# Patient Record
Sex: MALE | Race: WHITE
[De-identification: names, ages, dates, MRNs, and addresses within clinical notes are randomized per-mention and may not be internally consistent; named-entity substitution may affect disease eponyms.]

---

## 2020-12-22 ENCOUNTER — HOSPITAL ENCOUNTER (EMERGENCY)
Age: 40
Discharge: HOME | End: 2020-12-22
Payer: COMMERCIAL

## 2020-12-22 VITALS
OXYGEN SATURATION: 98 % | TEMPERATURE: 98.7 F | DIASTOLIC BLOOD PRESSURE: 91 MMHG | RESPIRATION RATE: 20 BRPM | SYSTOLIC BLOOD PRESSURE: 124 MMHG | HEART RATE: 74 BPM

## 2020-12-22 VITALS
SYSTOLIC BLOOD PRESSURE: 124 MMHG | HEART RATE: 74 BPM | DIASTOLIC BLOOD PRESSURE: 91 MMHG | RESPIRATION RATE: 20 BRPM | OXYGEN SATURATION: 98 % | TEMPERATURE: 98.7 F

## 2020-12-22 VITALS — BODY MASS INDEX: 25.4 KG/M2
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
## 2020-12-22 DIAGNOSIS — S39.013A: ICD-10-CM
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
## 2020-12-22 PROCEDURE — 99201: CPT

## 2021-01-13 ENCOUNTER — HOSPITAL ENCOUNTER (OUTPATIENT)
Dept: HOSPITAL 22 - INF | Age: 41
Discharge: HOME | End: 2021-01-13
Payer: COMMERCIAL

## 2021-01-13 VITALS
RESPIRATION RATE: 18 BRPM | TEMPERATURE: 99.32 F | OXYGEN SATURATION: 98 % | DIASTOLIC BLOOD PRESSURE: 98 MMHG | HEART RATE: 97 BPM | SYSTOLIC BLOOD PRESSURE: 157 MMHG

## 2021-01-13 DIAGNOSIS — K40.90: Primary | ICD-10-CM

## 2021-01-13 DIAGNOSIS — S39.013D: ICD-10-CM

## 2021-01-13 PROCEDURE — 96372 THER/PROPH/DIAG INJ SC/IM: CPT

## 2021-02-24 ENCOUNTER — HOSPITAL ENCOUNTER (OUTPATIENT)
Age: 41
End: 2021-02-24
Payer: COMMERCIAL

## 2021-02-24 DIAGNOSIS — S39.013A: Primary | ICD-10-CM

## 2021-02-24 PROCEDURE — 74176 CT ABD & PELVIS W/O CONTRAST: CPT

## 2021-03-18 ENCOUNTER — HOSPITAL ENCOUNTER (OUTPATIENT)
Age: 41
End: 2021-03-18
Payer: COMMERCIAL

## 2021-03-18 VITALS
OXYGEN SATURATION: 96 % | HEART RATE: 81 BPM | SYSTOLIC BLOOD PRESSURE: 144 MMHG | RESPIRATION RATE: 20 BRPM | DIASTOLIC BLOOD PRESSURE: 99 MMHG

## 2021-03-18 VITALS — BODY MASS INDEX: 28.1 KG/M2

## 2021-03-18 DIAGNOSIS — G89.29: ICD-10-CM

## 2021-03-18 DIAGNOSIS — R10.31: Primary | ICD-10-CM

## 2021-03-18 PROCEDURE — G0463 HOSPITAL OUTPT CLINIC VISIT: HCPCS

## 2021-03-18 PROCEDURE — 99202 OFFICE O/P NEW SF 15 MIN: CPT

## 2021-04-05 ENCOUNTER — HOSPITAL ENCOUNTER (OUTPATIENT)
Age: 41
End: 2021-04-05
Payer: COMMERCIAL

## 2021-04-05 DIAGNOSIS — R10.31: Primary | ICD-10-CM

## 2021-04-05 PROCEDURE — 72195 MRI PELVIS W/O DYE: CPT

## 2021-04-15 ENCOUNTER — HOSPITAL ENCOUNTER (OUTPATIENT)
Age: 41
End: 2021-04-15
Payer: COMMERCIAL

## 2021-04-15 VITALS
DIASTOLIC BLOOD PRESSURE: 72 MMHG | SYSTOLIC BLOOD PRESSURE: 139 MMHG | OXYGEN SATURATION: 98 % | HEART RATE: 68 BPM | RESPIRATION RATE: 18 BRPM

## 2021-04-15 VITALS — BODY MASS INDEX: 26.6 KG/M2

## 2021-04-15 DIAGNOSIS — R10.2: Primary | ICD-10-CM

## 2021-04-15 PROCEDURE — 99212 OFFICE O/P EST SF 10 MIN: CPT

## 2021-04-15 PROCEDURE — G0463 HOSPITAL OUTPT CLINIC VISIT: HCPCS

## 2021-05-27 ENCOUNTER — HOSPITAL ENCOUNTER (OUTPATIENT)
Age: 41
End: 2021-05-27
Payer: COMMERCIAL

## 2021-05-27 VITALS
DIASTOLIC BLOOD PRESSURE: 90 MMHG | OXYGEN SATURATION: 98 % | HEART RATE: 103 BPM | RESPIRATION RATE: 18 BRPM | SYSTOLIC BLOOD PRESSURE: 146 MMHG

## 2021-05-27 VITALS — BODY MASS INDEX: 27.3 KG/M2

## 2021-05-27 DIAGNOSIS — M46.1: Primary | ICD-10-CM

## 2021-05-27 DIAGNOSIS — M25.551: ICD-10-CM

## 2021-05-27 DIAGNOSIS — R10.31: ICD-10-CM

## 2021-05-27 PROCEDURE — G0463 HOSPITAL OUTPT CLINIC VISIT: HCPCS

## 2021-05-27 PROCEDURE — 99212 OFFICE O/P EST SF 10 MIN: CPT

## 2021-06-21 ENCOUNTER — HOSPITAL ENCOUNTER (OUTPATIENT)
Age: 41
End: 2021-06-21
Payer: COMMERCIAL

## 2021-06-21 VITALS — BODY MASS INDEX: 27.3 KG/M2

## 2021-06-21 VITALS — RESPIRATION RATE: 18 BRPM | OXYGEN SATURATION: 98 %

## 2021-06-21 DIAGNOSIS — M54.5: ICD-10-CM

## 2021-06-21 DIAGNOSIS — M25.551: ICD-10-CM

## 2021-06-21 DIAGNOSIS — R10.31: ICD-10-CM

## 2021-06-21 DIAGNOSIS — M54.16: ICD-10-CM

## 2021-06-21 DIAGNOSIS — M46.1: Primary | ICD-10-CM

## 2021-06-21 PROCEDURE — 99212 OFFICE O/P EST SF 10 MIN: CPT

## 2021-06-21 PROCEDURE — G0463 HOSPITAL OUTPT CLINIC VISIT: HCPCS

## 2021-07-05 ENCOUNTER — HOSPITAL ENCOUNTER (OUTPATIENT)
Age: 41
End: 2021-07-05
Payer: COMMERCIAL

## 2021-07-05 VITALS
OXYGEN SATURATION: 96 % | SYSTOLIC BLOOD PRESSURE: 153 MMHG | HEART RATE: 89 BPM | RESPIRATION RATE: 18 BRPM | DIASTOLIC BLOOD PRESSURE: 105 MMHG

## 2021-07-05 VITALS — BODY MASS INDEX: 27.3 KG/M2

## 2021-07-05 DIAGNOSIS — M25.551: ICD-10-CM

## 2021-07-05 DIAGNOSIS — M54.5: Primary | ICD-10-CM

## 2021-07-05 DIAGNOSIS — R10.31: ICD-10-CM

## 2021-07-05 PROCEDURE — G0463 HOSPITAL OUTPT CLINIC VISIT: HCPCS

## 2021-07-05 PROCEDURE — 99212 OFFICE O/P EST SF 10 MIN: CPT

## 2021-07-06 ENCOUNTER — HOSPITAL ENCOUNTER (OUTPATIENT)
Dept: HOSPITAL 22 - PT | Age: 41
Discharge: HOME | End: 2021-07-06
Payer: COMMERCIAL

## 2021-07-06 DIAGNOSIS — M25.552: ICD-10-CM

## 2021-07-06 DIAGNOSIS — M54.5: Primary | ICD-10-CM

## 2021-07-06 DIAGNOSIS — M25.551: ICD-10-CM

## 2021-07-06 DIAGNOSIS — M79.604: ICD-10-CM

## 2021-07-06 DIAGNOSIS — M79.605: ICD-10-CM

## 2021-07-06 PROCEDURE — 97010 HOT OR COLD PACKS THERAPY: CPT

## 2021-07-06 PROCEDURE — 97035 APP MDLTY 1+ULTRASOUND EA 15: CPT

## 2021-07-06 PROCEDURE — 97110 THERAPEUTIC EXERCISES: CPT

## 2021-07-06 PROCEDURE — G0283 ELEC STIM OTHER THAN WOUND: HCPCS

## 2021-07-06 PROCEDURE — 20560 NDL INSJ W/O NJX 1 OR 2 MUSC: CPT

## 2021-07-06 PROCEDURE — 97163 PT EVAL HIGH COMPLEX 45 MIN: CPT

## 2021-07-06 PROCEDURE — 97014 ELECTRIC STIMULATION THERAPY: CPT

## 2021-07-22 ENCOUNTER — HOSPITAL ENCOUNTER (OUTPATIENT)
Age: 41
End: 2021-07-22
Payer: COMMERCIAL

## 2021-07-22 VITALS — BODY MASS INDEX: 28 KG/M2

## 2021-07-22 VITALS
HEART RATE: 83 BPM | OXYGEN SATURATION: 98 % | SYSTOLIC BLOOD PRESSURE: 134 MMHG | RESPIRATION RATE: 18 BRPM | DIASTOLIC BLOOD PRESSURE: 90 MMHG

## 2021-07-22 DIAGNOSIS — M46.1: Primary | ICD-10-CM

## 2021-07-22 DIAGNOSIS — M70.61: ICD-10-CM

## 2021-07-22 PROCEDURE — 99212 OFFICE O/P EST SF 10 MIN: CPT

## 2021-07-22 PROCEDURE — G0463 HOSPITAL OUTPT CLINIC VISIT: HCPCS

## 2021-08-06 ENCOUNTER — HOSPITAL ENCOUNTER (OUTPATIENT)
Age: 41
End: 2021-08-06
Payer: COMMERCIAL

## 2021-08-06 VITALS
DIASTOLIC BLOOD PRESSURE: 68 MMHG | RESPIRATION RATE: 20 BRPM | SYSTOLIC BLOOD PRESSURE: 184 MMHG | OXYGEN SATURATION: 98 % | TEMPERATURE: 98.06 F | HEART RATE: 68 BPM

## 2021-08-06 VITALS
OXYGEN SATURATION: 98 % | RESPIRATION RATE: 18 BRPM | DIASTOLIC BLOOD PRESSURE: 64 MMHG | SYSTOLIC BLOOD PRESSURE: 179 MMHG | HEART RATE: 72 BPM

## 2021-08-06 VITALS
SYSTOLIC BLOOD PRESSURE: 129 MMHG | DIASTOLIC BLOOD PRESSURE: 79 MMHG | HEART RATE: 83 BPM | RESPIRATION RATE: 18 BRPM | OXYGEN SATURATION: 99 %

## 2021-08-06 VITALS
RESPIRATION RATE: 18 BRPM | DIASTOLIC BLOOD PRESSURE: 96 MMHG | SYSTOLIC BLOOD PRESSURE: 132 MMHG | HEART RATE: 74 BPM | OXYGEN SATURATION: 99 %

## 2021-08-06 VITALS — BODY MASS INDEX: 28.8 KG/M2

## 2021-08-06 DIAGNOSIS — G43.909: ICD-10-CM

## 2021-08-06 DIAGNOSIS — Z88.6: ICD-10-CM

## 2021-08-06 DIAGNOSIS — Z72.0: ICD-10-CM

## 2021-08-06 DIAGNOSIS — M70.61: ICD-10-CM

## 2021-08-06 DIAGNOSIS — M46.1: Primary | ICD-10-CM

## 2021-08-06 DIAGNOSIS — Z88.2: ICD-10-CM

## 2021-08-06 PROCEDURE — 20610 DRAIN/INJ JOINT/BURSA W/O US: CPT

## 2021-08-06 PROCEDURE — 77002 NEEDLE LOCALIZATION BY XRAY: CPT

## 2021-08-06 PROCEDURE — 27096 INJECT SACROILIAC JOINT: CPT

## 2021-08-06 PROCEDURE — G0260 INJ FOR SACROILIAC JT ANESTH: HCPCS

## 2021-08-10 ENCOUNTER — HOSPITAL ENCOUNTER (OUTPATIENT)
Age: 41
End: 2021-08-10
Payer: COMMERCIAL

## 2021-08-10 DIAGNOSIS — M54.5: Primary | ICD-10-CM

## 2021-08-10 PROCEDURE — 76376 3D RENDER W/INTRP POSTPROCES: CPT

## 2021-08-10 PROCEDURE — 72148 MRI LUMBAR SPINE W/O DYE: CPT

## 2021-08-16 ENCOUNTER — HOSPITAL ENCOUNTER (OUTPATIENT)
Age: 41
End: 2021-08-16
Payer: COMMERCIAL

## 2021-08-16 VITALS
RESPIRATION RATE: 18 BRPM | DIASTOLIC BLOOD PRESSURE: 90 MMHG | HEART RATE: 97 BPM | SYSTOLIC BLOOD PRESSURE: 146 MMHG | OXYGEN SATURATION: 98 %

## 2021-08-16 VITALS — BODY MASS INDEX: 28.8 KG/M2

## 2021-08-16 DIAGNOSIS — M51.16: Primary | ICD-10-CM

## 2021-08-16 PROCEDURE — G0463 HOSPITAL OUTPT CLINIC VISIT: HCPCS

## 2021-08-16 PROCEDURE — 99212 OFFICE O/P EST SF 10 MIN: CPT

## 2021-08-27 ENCOUNTER — HOSPITAL ENCOUNTER (OUTPATIENT)
Dept: HOSPITAL 22 - SC.PAINP | Age: 41
Discharge: HOME | End: 2021-08-27
Payer: COMMERCIAL

## 2021-08-27 VITALS
RESPIRATION RATE: 18 BRPM | DIASTOLIC BLOOD PRESSURE: 97 MMHG | OXYGEN SATURATION: 97 % | SYSTOLIC BLOOD PRESSURE: 137 MMHG | TEMPERATURE: 98.6 F | HEART RATE: 104 BPM

## 2021-08-27 VITALS
HEART RATE: 101 BPM | RESPIRATION RATE: 18 BRPM | OXYGEN SATURATION: 98 % | DIASTOLIC BLOOD PRESSURE: 79 MMHG | SYSTOLIC BLOOD PRESSURE: 122 MMHG

## 2021-08-27 VITALS
RESPIRATION RATE: 18 BRPM | HEART RATE: 98 BPM | DIASTOLIC BLOOD PRESSURE: 79 MMHG | SYSTOLIC BLOOD PRESSURE: 122 MMHG | OXYGEN SATURATION: 97 %

## 2021-08-27 VITALS
SYSTOLIC BLOOD PRESSURE: 126 MMHG | HEART RATE: 105 BPM | OXYGEN SATURATION: 97 % | RESPIRATION RATE: 20 BRPM | DIASTOLIC BLOOD PRESSURE: 90 MMHG

## 2021-08-27 VITALS — BODY MASS INDEX: 28.4 KG/M2

## 2021-08-27 DIAGNOSIS — Z88.2: ICD-10-CM

## 2021-08-27 DIAGNOSIS — M51.16: Primary | ICD-10-CM

## 2021-08-27 DIAGNOSIS — Z88.6: ICD-10-CM

## 2021-08-27 DIAGNOSIS — J45.909: ICD-10-CM

## 2021-08-27 PROCEDURE — 62323 NJX INTERLAMINAR LMBR/SAC: CPT

## 2021-10-01 ENCOUNTER — HOSPITAL ENCOUNTER (OUTPATIENT)
Age: 41
End: 2021-10-01
Payer: COMMERCIAL

## 2021-10-01 VITALS — BODY MASS INDEX: 28.8 KG/M2

## 2021-10-01 VITALS
DIASTOLIC BLOOD PRESSURE: 99 MMHG | HEART RATE: 88 BPM | RESPIRATION RATE: 20 BRPM | TEMPERATURE: 98.9 F | OXYGEN SATURATION: 99 % | SYSTOLIC BLOOD PRESSURE: 151 MMHG

## 2021-10-01 DIAGNOSIS — M54.31: ICD-10-CM

## 2021-10-01 DIAGNOSIS — M51.16: Primary | ICD-10-CM

## 2021-10-01 DIAGNOSIS — M46.1: ICD-10-CM

## 2021-10-01 PROCEDURE — 99212 OFFICE O/P EST SF 10 MIN: CPT

## 2021-10-01 PROCEDURE — G0463 HOSPITAL OUTPT CLINIC VISIT: HCPCS

## 2021-11-11 ENCOUNTER — OFFICE VISIT (OUTPATIENT)
Dept: NEUROSURGERY | Facility: CLINIC | Age: 41
End: 2021-11-11

## 2021-11-11 VITALS
BODY MASS INDEX: 30.22 KG/M2 | TEMPERATURE: 97.1 F | WEIGHT: 199.4 LBS | DIASTOLIC BLOOD PRESSURE: 76 MMHG | HEIGHT: 68 IN | SYSTOLIC BLOOD PRESSURE: 114 MMHG

## 2021-11-11 DIAGNOSIS — M25.559 HIP PAIN: ICD-10-CM

## 2021-11-11 DIAGNOSIS — M51.26 LUMBAR DISC HERNIATION: Primary | ICD-10-CM

## 2021-11-11 PROCEDURE — 99204 OFFICE O/P NEW MOD 45 MIN: CPT | Performed by: NEUROLOGICAL SURGERY

## 2021-11-11 RX ORDER — ACETAMINOPHEN 500 MG
500 TABLET ORAL EVERY 6 HOURS PRN
COMMUNITY

## 2021-11-11 RX ORDER — IBUPROFEN 200 MG
200 TABLET ORAL EVERY 6 HOURS PRN
COMMUNITY

## 2021-11-11 NOTE — PATIENT INSTRUCTIONS
Please have the following items complete prior to your telephone visit with Dr. Mireles.    1. EMG/NCV (Nerve Conduction Study)  2. MRI Hip (Right)  3. XR Hips  4. XR Lumbar Spine -Flexion/Extension

## 2021-11-11 NOTE — PROGRESS NOTES
NAME: TOMER BROWN JR.   DOS: 2021  : 1980  PCP: Enoch Muir MD    Chief Complaint:    Chief Complaint   Patient presents with   • Low Back Pain & Right Hip/Leg Pain       History of Present Illness:  40 y.o. male   I saw this 40-year-old gentleman in neurosurgical consultation for Worker's Comp. related play claim.  Interestingly he presents with almost a year of symptomatology.  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    He is plagued by chronic daily back and right-sided buttock pain the pain is also associated with inguinal area tenderness as well as lateral iliotibial band he denies pain that radiates down the leg he denies neurogenic claudication he does have daily back pain ever since his accident and has been through multiple courses of therapy he is accompanied here by his significant other as well as his     He denies cauda equina syndrome    PMHX  Allergies:  Allergies   Allergen Reactions   • Codeine Other (See Comments)     Mother allergic   • Sulfa Antibiotics Nausea And Vomiting     Medications    Current Outpatient Medications:   •  acetaminophen (TYLENOL) 500 MG tablet, Take 500 mg by mouth Every 6 (Six) Hours As Needed for Mild Pain ., Disp: , Rfl:   •  ibuprofen (ADVIL,MOTRIN) 200 MG tablet, Take 200 mg by mouth Every 6 (Six) Hours As Needed for Mild Pain ., Disp: , Rfl:   Past Medical History:  Past Medical History:   Diagnosis Date   • Headache    • Low back pain    • Thyroid disease      Past Surgical History:  Past Surgical History:    Procedure Laterality Date   • WISDOM TOOTH EXTRACTION       Social Hx:  Social History     Tobacco Use   • Smoking status: Former Smoker     Packs/day: 1.00     Quit date:      Years since quittin.8   • Smokeless tobacco: Never Used   Vaping Use   • Vaping Use: Never used   Substance Use Topics   • Alcohol use: Yes     Comment: occasional   • Drug use: Never     Family Hx:  Family History   Problem Relation Age of Onset   • Hypertension Mother    • Hypertension Father    • Diabetes Maternal Grandfather      Review of Systems:        Review of Systems   Constitutional: Negative for activity change, appetite change, chills, diaphoresis, fatigue, fever and unexpected weight change.   HENT: Negative for congestion, dental problem, drooling, ear discharge, ear pain, facial swelling, hearing loss, mouth sores, nosebleeds, postnasal drip, rhinorrhea, sinus pressure, sinus pain, sneezing, sore throat, tinnitus, trouble swallowing and voice change.    Eyes: Negative for photophobia, pain, discharge, redness, itching and visual disturbance.   Respiratory: Negative for apnea, cough, choking, chest tightness, shortness of breath, wheezing and stridor.    Cardiovascular: Negative for chest pain, palpitations and leg swelling.   Gastrointestinal: Negative for abdominal distention, abdominal pain, anal bleeding, blood in stool, constipation, diarrhea, nausea, rectal pain and vomiting.   Endocrine: Negative for cold intolerance, heat intolerance, polydipsia, polyphagia and polyuria.   Genitourinary: Negative for decreased urine volume, difficulty urinating, dysuria, enuresis, flank pain, frequency, genital sores, hematuria, penile discharge, penile pain, penile swelling, scrotal swelling, testicular pain and urgency.   Musculoskeletal: Positive for back pain. Negative for arthralgias, gait problem, joint swelling, myalgias, neck pain and neck stiffness.   Skin: Negative for color change, pallor, rash and wound.    Allergic/Immunologic: Negative for environmental allergies, food allergies and immunocompromised state.   Neurological: Negative for dizziness, tremors, seizures, syncope, facial asymmetry, speech difficulty, weakness, light-headedness, numbness and headaches.   Hematological: Negative for adenopathy. Does not bruise/bleed easily.   Psychiatric/Behavioral: Negative for agitation, behavioral problems, confusion, decreased concentration, dysphoric mood, hallucinations, self-injury, sleep disturbance and suicidal ideas. The patient is not nervous/anxious and is not hyperactive.    I have reviewed this note template and all pertinent parts of the review of systems social, family history, surgical history and medication list        Physical Examination:  Vitals:    11/11/21 1531   BP: 114/76   Temp: 97.1 °F (36.2 °C)      General Appearance:   Well developed, well nourished, well groomed, alert, and cooperative.  Neurological examination:  Neurologic Exam  Vital signs were reviewed and documented in the chart  Patient appeared in good neurologic function with normal comprehension fluent speech  Mood and affect are normal  Sense of smell deferred  He appears uncomfortable  Covid mask left in place  Back is without lesions  Muscle bulk and tone normal  5 out of 5 strength no motor drift, guarding on examination  Gait normal intact, antalgic  Negative Romberg  No clonus long tract signs or myelopathy    Reflexes symmetric  No edema noted and extremities skin appears normal    Straight leg raise sign absent bilaterally  Bilateral worse on the right side signs of intrinsic hip dysfunction  Back is without any lesions or abnormality  Feet are warm and well perfused        Review of Imaging/DATA:  I reviewed the MRI of the lumbar spine its a moderate quality study has isolated degenerative changes with annular disruption at L4-5 interestingly most of his small disc bulging is eccentric to the left think is more concordant  with his recent left leg flareup  Diagnoses/Plan:    Mr. Keller is a 40 y.o. male   There is a 40-year-old gentleman who is a about a year old history of workers comp related injury he has been back to light duty but is plagued by daily back pain he has right inguinal pain and no evidence of clear-cut radiculopathy below the knee he has signs of intrinsic hip dysfunction on the right side I suspect he may have a labral tear it is difficult to ascertain he certainly has degenerative changes at L4-5 with what I would suspect is lateral recess or and/or central disc bulging    My plan will be for    1.  Right-sided hip MRI, hip x-ray to look for labral tears and soft tissue issues  2.  EMG nerve conduction study lower extremity bilateral  3.  Lumbar flexion-extension film to evaluate for mechanical instability    If the symptoms show a labral and/or acetabular pathology I would refer to an orthopedist    If his symptoms are consistent with L5 radiculopathy I would recommend lumbar myelogram with possible discography for further work-up    I explained the complexities of this issue given its longstanding nature and it may be multifactorial  I will reviewed the studies and set up a follow-up by phone if there is something that I need to be involved in regarding lumbar spinal surgical care I will see him back in person    Is been a pleasure via neurosurgical care I see nothing that should threaten his function at present but he certainly is uncomfortable

## 2021-11-23 ENCOUNTER — TELEPHONE (OUTPATIENT)
Dept: NEUROSURGERY | Facility: CLINIC | Age: 41
End: 2021-11-23

## 2021-11-23 NOTE — TELEPHONE ENCOUNTER
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 SHE STATES SHE HAS LEFT SEVERAL MESSAGES FOR ARAM AND HAS NOT RECEIVED WORD - SENDING HIGH PRIORITY DUE TO MULTIPLE ATTEMPTS TO CONTACT. PLEASE CB @ [phone]. THANK YOU.

## 2021-12-01 DIAGNOSIS — S39.013D: ICD-10-CM

## 2021-12-01 DIAGNOSIS — S76.211A STRAIN OF ADDUCTOR MAGNUS MUSCLE, RIGHT, INITIAL ENCOUNTER: Primary | ICD-10-CM

## 2021-12-03 NOTE — PROGRESS NOTES
Per Rosetta: MRI, Both XR, and EMG all need to have the following diagnosis codes in order to be approved.   S76.211A & S39.013D.     All orders have been replaced with the above codes.

## 2022-01-20 ENCOUNTER — HOSPITAL ENCOUNTER (OUTPATIENT)
Dept: MRI IMAGING | Facility: HOSPITAL | Age: 42
Discharge: HOME | End: 2022-01-20

## 2022-01-20 ENCOUNTER — HOSPITAL ENCOUNTER (OUTPATIENT)
Dept: GENERAL RADIOLOGY | Facility: HOSPITAL | Age: 42
Discharge: HOME | End: 2022-01-20

## 2022-01-20 DIAGNOSIS — S76.211A STRAIN OF ADDUCTOR MAGNUS MUSCLE, RIGHT, INITIAL ENCOUNTER: ICD-10-CM

## 2022-01-20 DIAGNOSIS — S39.013D: ICD-10-CM

## 2022-01-20 PROCEDURE — 73502 X-RAY EXAM HIP UNI 2-3 VIEWS: CPT

## 2022-01-20 PROCEDURE — 73721 MRI JNT OF LWR EXTRE W/O DYE: CPT

## 2022-01-20 PROCEDURE — 72120 X-RAY BEND ONLY L-S SPINE: CPT

## 2022-02-07 ENCOUNTER — OFFICE VISIT (OUTPATIENT)
Dept: NEUROSURGERY | Facility: CLINIC | Age: 42
End: 2022-02-07

## 2022-02-07 VITALS
HEIGHT: 68 IN | BODY MASS INDEX: 30.52 KG/M2 | TEMPERATURE: 98.2 F | HEART RATE: 75 BPM | WEIGHT: 201.4 LBS | DIASTOLIC BLOOD PRESSURE: 90 MMHG | SYSTOLIC BLOOD PRESSURE: 130 MMHG | OXYGEN SATURATION: 98 %

## 2022-02-07 DIAGNOSIS — M51.26 RUPTURED LUMBAR DISC: ICD-10-CM

## 2022-02-07 DIAGNOSIS — M51.26 HNP (HERNIATED NUCLEUS PULPOSUS), LUMBAR: Primary | ICD-10-CM

## 2022-02-07 PROCEDURE — 99214 OFFICE O/P EST MOD 30 MIN: CPT | Performed by: NEUROLOGICAL SURGERY

## 2022-02-07 NOTE — PROGRESS NOTES
NAME: TOMER BROWN JR.   DOS: 2022  : 1980  PCP: Enoch Muir MD    Chief Complaint:    Chief Complaint   Patient presents with   • Low back and right hip/leg pain       History of Present Illness:  41 y.o. male   I saw this 41-year-old male in neurosurgical consultation. He has a history of Worker's Comp. he is been off work for a year he interestingly presented with some right inguinal pain has been worked up for hernia and then has left-sided paraspinal and sciatic pain    The majority of his complaints are in his right groin area he is here for follow-up with his     [facility]  Allergies:  Allergies   Allergen Reactions   • Codeine Other (See Comments)     Mother allergic   • Sulfa Antibiotics Nausea And Vomiting     Medications    Current Outpatient Medications:   •  acetaminophen (TYLENOL) 500 MG tablet, Take 500 mg by mouth Every 6 (Six) Hours As Needed for Mild Pain ., Disp: , Rfl:   •  ibuprofen (ADVIL,MOTRIN) 200 MG tablet, Take 200 mg by mouth Every 6 (Six) Hours As Needed for Mild Pain ., Disp: , Rfl:   •  Menthol, Topical Analgesic, (BIOFREEZE EX), Apply  topically As Needed., Disp: , Rfl:   Past Medical History:  Past Medical History:   Diagnosis Date   • Headache    • Low back pain    • Thyroid disease      Past Surgical History:  Past Surgical History:   Procedure Laterality Date   • WISDOM TOOTH EXTRACTION       Social Hx:  Social History     Tobacco Use   • Smoking status: Former Smoker     Packs/day: 1.00     Quit date:      Years since quittin.1   • Smokeless tobacco: Never Used   Vaping Use   • Vaping Use: Never used   Substance Use Topics   • Alcohol use: Yes     Comment: occasional   • Drug use: Never     Family Hx:  Family History   Problem Relation Age of Onset   • Hypertension Mother    • Hypertension Father    • Diabetes Maternal Grandfather      Review of Systems:        Review of Systems   Constitutional: Negative for activity change, appetite  change, chills, diaphoresis, fatigue, fever and unexpected weight change.   HENT: Negative for congestion, dental problem, drooling, ear discharge, ear pain, facial swelling, hearing loss, mouth sores, nosebleeds, postnasal drip, rhinorrhea, sinus pressure, sinus pain, sneezing, sore throat, tinnitus, trouble swallowing and voice change.    Eyes: Negative for photophobia, pain, discharge, redness, itching and visual disturbance.   Respiratory: Negative for apnea, cough, choking, chest tightness, shortness of breath, wheezing and stridor.    Cardiovascular: Negative for chest pain, palpitations and leg swelling.   Gastrointestinal: Negative for abdominal distention, abdominal pain, anal bleeding, blood in stool, constipation, diarrhea, nausea, rectal pain and vomiting.   Endocrine: Negative for cold intolerance, heat intolerance, polydipsia, polyphagia and polyuria.   Genitourinary: Negative for decreased urine volume, difficulty urinating, dysuria, enuresis, flank pain, frequency, genital sores, hematuria and urgency.   Musculoskeletal: Positive for back pain. Negative for arthralgias, gait problem, joint swelling, myalgias, neck pain and neck stiffness.   Skin: Negative for color change, pallor, rash and wound.   Allergic/Immunologic: Negative for environmental allergies, food allergies and immunocompromised state.   Neurological: Negative for dizziness, tremors, seizures, syncope, facial asymmetry, speech difficulty, weakness, light-headedness, numbness and headaches.   Hematological: Negative for adenopathy. Does not bruise/bleed easily.   Psychiatric/Behavioral: Negative for agitation, behavioral problems, confusion, decreased concentration, dysphoric mood, hallucinations, self-injury, sleep disturbance and suicidal ideas. The patient is not nervous/anxious and is not hyperactive.    All other systems reviewed and are negative.           Physical Examination:  Vitals:    02/07/22 1151   BP: 130/90   Pulse: 75  "  Temp: 98.2 °F (36.8 °C)   SpO2: 98%      General Appearance:   Well developed, well nourished, well groomed, alert, and cooperative.  Neurological examination:  Neurologic Exam    He is at his baseline with no intrinsic hip dysfunction    He has no weakness overall appears unchanged  Review of Imaging/DATA:  I reviewed his MRI of the lumbar spine there is a left eccentric disc bulge and/or herniation, there is a suspected annular tear with a \"white dots\" signed his MRI of the hip is unremarkable his x-ray of the hip is unremarkable in the right and his lumbar flexion-extension film    Also reviewed his EMG nerve conduction study that is unremarkable for radiculopathy  Diagnoses/Plan:    Mr. Keller is a 41 y.o. male   This is a 41-year-old with a recently rare case of a lifting injury at work where he sustained I suspect an annular tear at L4-5 with left eccentric disc bulge and/or herniation that has somewhat resolved. He was made worse by a lumbar epidural steroid block on the left. I talked to him about the ins and outs of surgery at long-term risk and spent about 30 minutes with him unpacking the long-term outcome of lumbar interbody fusion for degenerative disc    I feel that his accident was likely a major contributor to his symptoms. I also detect no secondary gain options he just really wants to go back to work but with or without any sort of surgical intervention I do not know he will be able to do that free of some sort of restriction    I also feel that his right inguinal pain is secondary to an annular tear referred pain in the L5 distribution absolutely can produce groin and testicular pain with loading    In the meanwhile working recommend return to Dr.bux for epidural steroid block. I would like him to get a right transforaminal 4 5 block to see if it assists with his inguinal pain    He will require lumbar myelogram IF he wishes to proceed with surgical intervention    He can also proceed without " any sort of surgery most likely safely if he is able to cope with the pain and as far as it pertains to his work situation I do not know that surgery will be effective at restoration of the type labor he did    I will put him on a restriction 5 pounds no lifting bending or twisting, he is likely to require FCE    I talked to saloni BURNS MD personally about this patient transcriptions her care I like to see if a RIGHT L4-5 foraminal block assist with his inguinal pain

## 2022-02-15 ENCOUNTER — TELEPHONE (OUTPATIENT)
Dept: NEUROSURGERY | Facility: CLINIC | Age: 42
End: 2022-02-15

## 2022-02-15 NOTE — TELEPHONE ENCOUNTER
Caller: CHRISTOPHER BYRD    Relationship: Payer    Best call back number: [phone]    What form or medical record are you requesting: OVN / DICTATED NOTES FROM 2/7/22 VISIT    Who is requesting this form or medical record from you: WORKERS [employer]    How would you like to receive the form or medical records (pick-up, mail, fax): FAX  If fax, what is the fax number: [phone]    Timeframe paperwork needed: ASAP    Additional notes: NURSE  STATES DICTATED REPORT IS NEEDED BEFORE PATIENT CAN BE APPROVED FOR EPIDURAL STEROID BLOCK.

## 2022-09-06 ENCOUNTER — HOSPITAL ENCOUNTER (EMERGENCY)
Dept: HOSPITAL 22 - UTC | Age: 42
Discharge: HOME | End: 2022-09-06
Payer: COMMERCIAL

## 2022-09-06 VITALS
RESPIRATION RATE: 20 BRPM | TEMPERATURE: 97.6 F | SYSTOLIC BLOOD PRESSURE: 131 MMHG | OXYGEN SATURATION: 99 % | HEART RATE: 73 BPM | DIASTOLIC BLOOD PRESSURE: 85 MMHG

## 2022-09-06 VITALS
TEMPERATURE: 97.6 F | HEART RATE: 78 BPM | DIASTOLIC BLOOD PRESSURE: 95 MMHG | RESPIRATION RATE: 20 BRPM | SYSTOLIC BLOOD PRESSURE: 138 MMHG | OXYGEN SATURATION: 98 %

## 2022-09-06 VITALS — BODY MASS INDEX: 28 KG/M2

## 2022-09-06 DIAGNOSIS — Z79.82: ICD-10-CM

## 2022-09-06 DIAGNOSIS — Z88.5: ICD-10-CM

## 2022-09-06 DIAGNOSIS — M54.16: Primary | ICD-10-CM

## 2022-09-06 DIAGNOSIS — Z88.2: ICD-10-CM

## 2022-09-06 DIAGNOSIS — M62.838: ICD-10-CM

## 2022-09-06 PROCEDURE — 99282 EMERGENCY DEPT VISIT SF MDM: CPT

## 2022-11-29 ENCOUNTER — TELEPHONE (OUTPATIENT)
Dept: NEUROSURGERY | Facility: CLINIC | Age: 42
End: 2022-11-29

## 2022-11-29 NOTE — TELEPHONE ENCOUNTER
I do not think that this is necessarily emergent, it is not a true urinary incontinence which would be more suggestive of a quada equina syndrome.  Regardless patient could theoretically have been a surgical candidate with Dr. Mireles last saw him in February, and I think he should follow-up with Mitesh Betts PA-C in the near future.  Please inform the patient to bring the disc from his MRI with him.

## 2022-11-29 NOTE — TELEPHONE ENCOUNTER
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 THE NEXT DAY HE WAS UNABLE TO EFFECTIVELY GET OUT OF BED AND WENT TO THE ER, WHERE HE WAS GIVEN STEROIDS. HE THEN SAW DR GALVAN AND WAS GIVEN AN INJECTION ON Thursday. BOTH OF THESE HELPED, BUT HE WAS UNABLE TO MOVE/WALK WITHOUT EXTREME PAIN UNTIL THE FOLLOWING Sunday.    SINCE September SYMPTOMS HAVE WORSENED. HE HAS ALSO SEEN PCP AND HAD A NEW MRI DONE @ [facility] ON 11/15 OR 11/16. DESCRIBES PAIN THAT STARTS IN LOWER BACK AND SHOOTS INTO HIPS, SARAH THE RIGHT; ALSO, NEEDLE PRICK SENSATION IN RIGHT KNEE, GROIN PAINS ON THE RIGHT, AND HIP TIGHTNESS. WHEN URINATING, HE IS HAVING TROUBLE WITH STARTING/STOPPING, AND STATES NEW ERECTILE DYSFUNCTION.    SENDING HIGH PRIORITY DUE TO URINARY SYMPTOMS. PLEASE CALL PATIENT TO ADVISE.

## 2022-12-15 ENCOUNTER — OFFICE VISIT (OUTPATIENT)
Dept: NEUROSURGERY | Facility: CLINIC | Age: 42
End: 2022-12-15

## 2022-12-15 VITALS
HEIGHT: 69 IN | SYSTOLIC BLOOD PRESSURE: 138 MMHG | BODY MASS INDEX: 27.93 KG/M2 | WEIGHT: 188.6 LBS | DIASTOLIC BLOOD PRESSURE: 88 MMHG | TEMPERATURE: 97.9 F

## 2022-12-15 DIAGNOSIS — M51.26 LUMBAR DISC HERNIATION: Primary | ICD-10-CM

## 2022-12-15 PROCEDURE — 99214 OFFICE O/P EST MOD 30 MIN: CPT | Performed by: PHYSICIAN ASSISTANT

## 2022-12-16 ENCOUNTER — PREP FOR SURGERY (OUTPATIENT)
Dept: OTHER | Facility: HOSPITAL | Age: 42
End: 2022-12-16

## 2022-12-16 DIAGNOSIS — M54.32 SCIATICA OF LEFT SIDE: Primary | ICD-10-CM

## 2022-12-16 PROBLEM — M51.26 LUMBAR DISC HERNIATION: Status: ACTIVE | Noted: 2022-12-16

## 2022-12-16 RX ORDER — HYDROCODONE BITARTRATE AND ACETAMINOPHEN 7.5; 325 MG/1; MG/1
1 TABLET ORAL ONCE
Status: CANCELLED | OUTPATIENT
Start: 2022-12-16 | End: 2022-12-16

## 2022-12-16 RX ORDER — SODIUM CHLORIDE, SODIUM LACTATE, POTASSIUM CHLORIDE, CALCIUM CHLORIDE 600; 310; 30; 20 MG/100ML; MG/100ML; MG/100ML; MG/100ML
9 INJECTION, SOLUTION INTRAVENOUS CONTINUOUS
Status: CANCELLED | OUTPATIENT
Start: 2022-12-16

## 2022-12-16 RX ORDER — ACETAMINOPHEN 325 MG/1
650 TABLET ORAL ONCE
Status: CANCELLED | OUTPATIENT
Start: 2022-12-16 | End: 2022-12-16

## 2022-12-16 RX ORDER — CEFAZOLIN SODIUM 2 G/100ML
2 INJECTION, SOLUTION INTRAVENOUS ONCE
Status: CANCELLED | OUTPATIENT
Start: 2022-12-16 | End: 2022-12-16

## 2022-12-16 RX ORDER — CHLORHEXIDINE GLUCONATE 4 G/100ML
SOLUTION TOPICAL
Qty: 120 ML | Refills: 0 | Status: SHIPPED | OUTPATIENT
Start: 2022-12-16

## 2022-12-16 RX ORDER — FAMOTIDINE 20 MG/1
20 TABLET, FILM COATED ORAL
Status: CANCELLED | OUTPATIENT
Start: 2022-12-16

## 2022-12-16 RX ORDER — IBUPROFEN 800 MG/1
800 TABLET ORAL ONCE
Status: CANCELLED | OUTPATIENT
Start: 2022-12-16 | End: 2022-12-16

## 2022-12-16 NOTE — PROGRESS NOTES
Patient: Vin Keller Jr.  : 1980    Primary Care Provider: Enoch Muir MD      Chief Complaint: Low back pain with sciatica    History of Present Illness:       Patient is a very nice 41-year-old gentleman who is known to the practice for seeing Dr. Mireles after having a little bit of right leg sciatica and some pretty interesting back pain after a fall off of a porch.  Patient is a  and work was injured on the job.  Over the last year he is trialed injections and has taken medicine and done physical therapy without avail.  Unfortunately in September of this year he reached for something and felt a bad pop in his back and since then has had worse left leg pain.  Patient also has still has intermittent right leg pain but the majority of his issues are coming from the left.     Patient brought in repeat imaging from his pain management provider that should reveals a new left-sided disc herniation filling up his neuroforaminal area on the left L4-5.     Dr. Mireles and he discussed things and outs of surgery and as well as the risk benefits and expected outcomes.    Patient understands that this is quite complicated given his age and the chronicity of the injury we will try the minimally invasive surgery but could require fusion in the future.  Patient understands the reason on not jumping straight to the fusion is there is a role in motion preservation for avoiding further surgeries in the future.    Review of Systems   Constitutional: Negative for activity change, appetite change, chills, fatigue and fever.   HENT: Negative for congestion, dental problem, ear pain, hearing loss, sinus pressure and tinnitus.    Eyes: Negative for pain and redness.   Respiratory: Negative for apnea, cough, shortness of breath and wheezing.    Cardiovascular: Negative for chest pain, palpitations and leg swelling.   Gastrointestinal: Negative for abdominal distention, abdominal pain, blood in stool,  "constipation, diarrhea, nausea and vomiting.   Endocrine: Negative for cold intolerance, heat intolerance and polyuria.   Genitourinary: Negative for enuresis, frequency and urgency.   Musculoskeletal: Positive for back pain and gait problem.   Skin: Negative for color change and rash.   Neurological: Negative for dizziness, tremors, seizures, syncope, speech difficulty, weakness, light-headedness, numbness and headaches.   Psychiatric/Behavioral: Negative for behavioral problems and confusion. The patient is not nervous/anxious.        Past Medical History:     Past Medical History:   Diagnosis Date   • Headache    • Low back pain    • Thyroid disease        Family History:     Family History   Problem Relation Age of Onset   • Hypertension Mother    • Hypertension Father    • Diabetes Maternal Grandfather        Social History:    reports that he quit smoking about 8 years ago. His smoking use included cigarettes. He smoked an average of 1 pack per day. He has never used smokeless tobacco. He reports current alcohol use. He reports that he does not use drugs.   SMOKING STATUS: Non-smoker    Surgical History:     Past Surgical History:   Procedure Laterality Date   • WISDOM TOOTH EXTRACTION         Allergies:   Codeine and Sulfa antibiotics    Physical Exam:    Vital Signs:/88   Temp 97.9 °F (36.6 °C)   Ht 175.3 cm (69\")   Wt 85.5 kg (188 lb 9.6 oz)   BMI 27.85 kg/m²    BMI: Body mass index is 27.85 kg/m².     GENERAL:           The patient is in no acute distress, and is able to answer all questions appropriately.    Neck:          Supple without lymphadenopathy    Cardiovascular:       Peripheral pulses 2+ at dorsalis pedis and posterior tibialis    Lungs:         Breathing unlabored    Musculoskeletal:            strength is 5 out of 5 bilaterally.        Shoulder abduction is 5 out of 5.         Dorsiflexion is 5/5 Bilaterally       Plantarflexion is 5/5 bilaterally       Hip Flexion 5/5 " bilaterally.         The patient´s gait is antalgic    Straight leg raise positive bilaterally worse on left than right  Internal extra rotation of the hip does not cause groin pain bilaterally    Neurologic:          The patient is alert and oriented by 3.          Pupils are equal and reactive to light.         Visual fields are full.         Extraocular movements are intact without nystagmus.         There is no evidence of central motor drift. No facial droop.  No difficulty with rapid alternating movements.         Sensation is equal bilaterally with no deficit.           Reflexes:  2+ through out    CRANIAL NERVES:    Deferred secondary to COVID mask  Medical Decision Making    Data Review:   Imaging reviewed and discussed with Dr. Mireles.  Patient has a new left-sided disc herniation at L4-5    Diagnosis:   Left L4-5 disc herniation  Left leg sciatica    Treatment Options:   We are going to set the patient up with left L4-5 microdiscectomy.  Hopefully this will expedite his recovery but we do not expect there to be miracles done for his work situation.  We will have to see how he does with the surgery when we contemplate allowing him to go back.  Patient understands the intricacies of the surgery and its role is to decrease pain in the leg.  A lot of time was taken to make sure that he understood we are not doing the surgery for return to work we are doing it to decrease his pain.  We were on the same page at the end of the visit and he wished to proceed with the discectomy    BMI is >= 25 and <30. (Overweight) The following options were offered after discussion;: weight loss educational material (shared in after visit summary)    No diagnosis found.

## 2022-12-29 ENCOUNTER — TELEPHONE (OUTPATIENT)
Dept: NEUROSURGERY | Facility: CLINIC | Age: 42
End: 2022-12-29

## 2022-12-29 NOTE — TELEPHONE ENCOUNTER
Caller: Vin Keller Jr.    Relationship: Self    Best call back number: [phone]    What is the best time to reach you: ANY TIME    Who are you requesting to speak with (clinical staff, provider,  specific staff member): CLINICAL    What was the call regarding: SURGERY UPDATE     Do you require a callback:     PATIENT CALLED AND WANTS TO GET AN UPDATE FOR WHEN HE WILL BE GETTING SURGERY.  STATES IF [facility] IS DENYING HE CAN USE HIS PERSONAL INSURANCE.  PLEASE CALL PATIENT WITH AN UPDATE.  THANK YOU!